# Patient Record
Sex: FEMALE | Race: WHITE | NOT HISPANIC OR LATINO | Employment: FULL TIME | URBAN - METROPOLITAN AREA
[De-identification: names, ages, dates, MRNs, and addresses within clinical notes are randomized per-mention and may not be internally consistent; named-entity substitution may affect disease eponyms.]

---

## 2018-09-11 ENCOUNTER — HOSPITAL ENCOUNTER (EMERGENCY)
Facility: HOSPITAL | Age: 48
Discharge: HOME | End: 2018-09-11
Attending: EMERGENCY MEDICINE
Payer: COMMERCIAL

## 2018-09-11 VITALS
SYSTOLIC BLOOD PRESSURE: 125 MMHG | TEMPERATURE: 98 F | DIASTOLIC BLOOD PRESSURE: 60 MMHG | HEART RATE: 77 BPM | RESPIRATION RATE: 18 BRPM | BODY MASS INDEX: 36.36 KG/M2 | HEIGHT: 67 IN | OXYGEN SATURATION: 95 % | WEIGHT: 231.69 LBS

## 2018-09-11 DIAGNOSIS — R09.A2 FOREIGN BODY SENSATION IN THROAT: Primary | ICD-10-CM

## 2018-09-11 PROCEDURE — 99283 EMERGENCY DEPT VISIT LOW MDM: CPT

## 2018-09-11 PROCEDURE — 25000003 PHARM REV CODE 250: Performed by: EMERGENCY MEDICINE

## 2018-09-11 RX ORDER — METOPROLOL TARTRATE 25 MG/1
25 TABLET, FILM COATED ORAL 2 TIMES DAILY
COMMUNITY

## 2018-09-11 RX ORDER — LISINOPRIL 20 MG/1
20 TABLET ORAL DAILY
COMMUNITY

## 2018-09-11 RX ORDER — GLIPIZIDE AND METFORMIN HCL 5; 500 MG/1; MG/1
1 TABLET, FILM COATED ORAL
COMMUNITY

## 2018-09-11 RX ADMIN — LIDOCAINE HYDROCHLORIDE 50 ML: 20 SOLUTION ORAL; TOPICAL at 08:09

## 2018-09-12 NOTE — ED PROVIDER NOTES
SCRIBE #1 NOTE: I, Silvestre Jacobson, am scribing for, and in the presence of, Shawn Brooks MD. I have scribed the HPI, ROS, and PEx.     SCRIBE #2 NOTE: I, Lakisha Ferguson, am scribing for, and in the presence of,  Rodger Sun MD. I have scribed the remaining portions of the note not scribed by Scribe #1.     History      Chief Complaint   Patient presents with    foreign body sensation     Pt c/o foreign body sensation since last night, no actual foreign body swallowing, c/o gagging today and vomiting x 3. sent from [facility] for further eval, xray negative       Review of patient's allergies indicates:   Allergen Reactions    Penicillins Swelling        HPI   HPI    9/11/2018, 7:56 PM   History obtained from the patient      History of Present Illness: Kellen Garcia is a 48 y.o. female patient who presents to the Emergency Department for an evaluation of a suspected foreign body in her throat which onset suddenly last night. Pt presents to ED c/o the sensation of a FB in her throat. Pt reports gagging today which prompted her to visit [facility] for further evalaution. Pt was then sent here for further evaluation following a negative xray. Pt also reports emesis. Symptoms are constant and moderate in severity. Exacerbated by swallowing and relieved by nothing. No associated sxs. Patient denies any fever, chills, trouble swallowing, rash, N/V, facial swelling, and all other sxs at this time. No further complaints or concerns at this time.     Arrival mode: Personal vehicle    PCP: Jesus Handy MD       Past Medical History:  Past Medical History:   Diagnosis Date    Diabetes mellitus     Hyperlipemia     Hypertension        Past Surgical History:  Past Surgical History:   Procedure Laterality Date    CHOLECYSTECTOMY      HYSTERECTOMY      TONSILLECTOMY           Family History:  Family History   Problem Relation Age of Onset    Cancer Mother     Breast cancer Mother     Heart disease Mother      Lupus Mother     Kidney disease Mother     Asthma Mother     Diabetes Mother     Hypertension Father        Social History:  Social History     Tobacco Use    Smoking status: Never Smoker    Smokeless tobacco: Never Used   Substance and Sexual Activity    Alcohol use: No     Frequency: Never    Drug use: No    Sexual activity: Yes     Partners: Male       ROS   Review of Systems   Constitutional: Negative for chills and fever.   HENT: Negative for congestion, sore throat and trouble swallowing.         (+) Sensation of FB   Respiratory: Negative for cough and shortness of breath.    Cardiovascular: Negative for chest pain.   Gastrointestinal: Negative for abdominal pain, nausea and vomiting.   Genitourinary: Negative for dysuria, frequency, hematuria and urgency.   Musculoskeletal: Negative for back pain and neck pain.   Skin: Negative for rash.   Neurological: Negative for dizziness, weakness, light-headedness and headaches.   Hematological: Does not bruise/bleed easily.     Physical Exam      Initial Vitals [09/11/18 1816]   BP Pulse Resp Temp SpO2   (!) 153/73 83 18 98.1 °F (36.7 °C) 98 %      MAP       --          Physical Exam  Nursing Notes and Vital Signs Reviewed.  Constitutional: Patient is in no acute distress. Well-developed and well-nourished.  Head: Atraumatic. Normocephalic.  Eyes: PERRL. EOM intact. Conjunctivae are not pale. No scleral icterus.  ENT: Mucous membranes are moist. Oropharynx is clear and symmetric.    Neck: Supple. Full ROM. No lymphadenopathy.  Cardiovascular: Regular rate. Regular rhythm. No murmurs, rubs, or gallops. Distal pulses are 2+ and symmetric.  Pulmonary/Chest: No respiratory distress. Clear to auscultation bilaterally. No wheezing or rales.  Abdominal: Soft and non-distended.  There is no tenderness.    Musculoskeletal: Moves all extremities. No obvious deformities. No edema. No calf tenderness.  Skin: Warm and dry.  Neurological:  Alert, awake, and appropriate.   "Normal speech.  No acute focal neurological deficits are appreciated.  Psychiatric: Normal affect. Good eye contact. Appropriate in content.    ED Course    Procedures  ED Vital Signs:  Vitals:    09/11/18 1816 09/11/18 2018 09/11/18 2054 09/11/18 2132   BP: (!) 153/73 (!) 143/74  125/60   Pulse: 83 90 77 77   Resp: 18      Temp: 98.1 °F (36.7 °C)      TempSrc: Oral      SpO2: 98% 100%  95%   Weight: 105.1 kg (231 lb 11.3 oz)      Height: 5' 7" (1.702 m)               The Emergency Provider reviewed the vital signs and test results, which are outlined above.    ED Discussion     8:00 PM: Dr. Brooks transfers care of pt to Dr. Sun, pending lab results.    9:10 PM: Re-evaluated pt. Pt is resting comfortably and is in no acute distress. Dr. Sun agrees with Dr. Brooks's assessment of pt. Pt denies any relief with GI cocktail. D/w pt all pertinent results. D/w pt any concerns expressed at this time. Answered all questions. Pt expresses understanding at this time.    9:16 PM Reassessed pt at this time.  Pt is awake, alert, and in NAD at this time. Pt advised to f/u with ENT tomorrow morning. Discussed with pt all pertinent ED information and results. Discussed pt dx and plan of tx. Gave pt all f/u and return to the ED instructions. All questions and concerns were addressed at this time. Pt expresses understanding of information and instructions, and is comfortable with plan to discharge. Pt is stable for discharge.      ED Medication(s):  Medications   GI cocktail (mylanta 30 mL, lidocaine 2 % viscous 10 mL, dicyclomine 10 mL) 50 mL (50 mLs Oral Given 9/11/18 2022)          Medication List      START taking these medications    diphenhydrAMINE-aluminum-magnesium hydroxide-simethicone-lidocaine HCl 2%  Swish and swallow 10 mLs every 4 (four) hours as needed.        ASK your doctor about these medications    glipizide-metformin 5-500 mg per tablet  Commonly known as:  METAGLIP     lisinopril 20 MG tablet  Commonly " known as:  PRINIVIL,ZESTRIL     METHYCLOTHIAZIDE ORAL     metoprolol tartrate 25 MG tablet  Commonly known as:  LOPRESSOR     ROSUVASTATIN ORAL           Where to Get Your Medications      You can get these medications from any pharmacy    Bring a paper prescription for each of these medications  · diphenhydrAMINE-aluminum-magnesium hydroxide-simethicone-lidocaine HCl 2%         Follow-up Information     Michelle Pereyra MD In 1 day.    Specialty:  Otolaryngology  Contact information:  [id number] [place] [address] Dr Pamella ROTHMAN [address] [phone]             [facility] - .    Specialty:  Emergency Medicine  Why:  As needed, If symptoms worsen  Contact information:  [address] [place] [address] [address]  [facility] [address] [phone]                   Medical Decision Making              Scribe Attestation:   Scribe #1: I performed the above scribed service and the documentation accurately describes the services I performed. I attest to the accuracy of the note.    Attending:   Physician Attestation Statement for Scribe #1: I, Shawn Brooks MD, personally performed the services described in this documentation, as scribed by Silvestre Jacobson, in my presence, and it is both accurate and complete.       Scribe Attestation:   Scribe #2: I performed the above scribed service and the documentation accurately describes the services I performed. I attest to the accuracy of the note.    Attending Attestation:           Physician Attestation for Scribe:    Physician Attestation Statement for Scribe #2: I, Rodger Sun MD, reviewed documentation, as scribed by Lakisha Ferguson in my presence, and it is both accurate and complete. I also acknowledge and confirm the content of the note done by Scribe #1.          Clinical Impression       ICD-10-CM ICD-9-CM   1. Foreign body sensation in throat R09.89 784.99       Disposition:   Disposition: Discharged  Condition: Stable         Rodger Sun MD  09/12/18 0519